# Patient Record
Sex: FEMALE | Race: WHITE | NOT HISPANIC OR LATINO | Employment: FULL TIME | URBAN - METROPOLITAN AREA
[De-identification: names, ages, dates, MRNs, and addresses within clinical notes are randomized per-mention and may not be internally consistent; named-entity substitution may affect disease eponyms.]

---

## 2018-09-09 ENCOUNTER — HOSPITAL ENCOUNTER (EMERGENCY)
Facility: OTHER | Age: 28
Discharge: HOME | End: 2018-09-09
Attending: EMERGENCY MEDICINE
Payer: COMMERCIAL

## 2018-09-09 VITALS
TEMPERATURE: 98 F | SYSTOLIC BLOOD PRESSURE: 148 MMHG | WEIGHT: 130 LBS | RESPIRATION RATE: 18 BRPM | DIASTOLIC BLOOD PRESSURE: 96 MMHG | BODY MASS INDEX: 20.89 KG/M2 | OXYGEN SATURATION: 100 % | HEIGHT: 66 IN | HEART RATE: 99 BPM

## 2018-09-09 DIAGNOSIS — K08.89 PAIN, DENTAL: Primary | ICD-10-CM

## 2018-09-09 LAB
B-HCG UR QL: NEGATIVE
CTP QC/QA: YES

## 2018-09-09 PROCEDURE — 25000003 PHARM REV CODE 250: Performed by: NURSE PRACTITIONER

## 2018-09-09 PROCEDURE — 81025 URINE PREGNANCY TEST: CPT | Performed by: EMERGENCY MEDICINE

## 2018-09-09 PROCEDURE — 99283 EMERGENCY DEPT VISIT LOW MDM: CPT

## 2018-09-09 RX ORDER — IBUPROFEN 400 MG/1
800 TABLET ORAL
Status: COMPLETED | OUTPATIENT
Start: 2018-09-09 | End: 2018-09-09

## 2018-09-09 RX ORDER — IBUPROFEN 800 MG/1
800 TABLET ORAL EVERY 6 HOURS PRN
Qty: 20 TABLET | Refills: 0 | Status: SHIPPED | OUTPATIENT
Start: 2018-09-09

## 2018-09-09 RX ADMIN — IBUPROFEN 800 MG: 400 TABLET ORAL at 02:09

## 2018-09-09 NOTE — ED PROVIDER NOTES
Encounter Date: 9/9/2018       History     Chief Complaint   Patient presents with    Dental Pain     pt c/o lower back right molar pain radiating to her ear. Pt reports recent cavity filling 2.5 weeks ago in [place] & has recently moved to [place].     26 y/o female which presents with right lower dental pain that began a few days after she had a dental procedure in [place]. The dental procedure was 2.5 weeks ago and she recently moved to [place]. She complains of ear pain and neck pain. She has been taking 400mg of ibuprofen and alternating extra strength tylenol. Pt denies fever, chills, nausea, or vomiting.       The history is provided by the patient.     Review of patient's allergies indicates:   Allergen Reactions    Penicillins Shortness Of Breath and Rash     History reviewed. No pertinent past medical history.  Past Surgical History:   Procedure Laterality Date    WISDOM TOOTH EXTRACTION       History reviewed. No pertinent family history.  Social History     Tobacco Use    Smoking status: Never Smoker    Smokeless tobacco: Never Used   Substance Use Topics    Alcohol use: Yes     Comment: occ    Drug use: No     Review of Systems   Constitutional: Negative.  Negative for chills and fever.   HENT: Positive for dental problem and ear pain. Negative for sore throat.    Respiratory: Negative.  Negative for cough and shortness of breath.    Cardiovascular: Negative.  Negative for chest pain.   Gastrointestinal: Negative.  Negative for abdominal pain, nausea and vomiting.   Musculoskeletal: Negative for myalgias.   All other systems reviewed and are negative.    Physical Exam     Initial Vitals [09/09/18 1347]   BP Pulse Resp Temp SpO2   (!) 148/96 99 18 97.9 °F (36.6 °C) 100 %      MAP       --         Physical Exam    Nursing note and vitals reviewed.  Constitutional: She appears well-developed and well-nourished.   HENT:   Head: Normocephalic and atraumatic.   Right Ear: Hearing, tympanic  membrane, external ear and ear canal normal.   Left Ear: Hearing, tympanic membrane, external ear and ear canal normal.   Nose: Nose normal. Right sinus exhibits no maxillary sinus tenderness and no frontal sinus tenderness. Left sinus exhibits no maxillary sinus tenderness and no frontal sinus tenderness.   Mouth/Throat: Uvula is midline, oropharynx is clear and moist and mucous membranes are normal. Normal dentition. No dental abscesses or dental caries. No oropharyngeal exudate or tonsillar abscesses.   NO signs of abnormal dentition- no signs of infection    Eyes: Conjunctivae and EOM are normal. Pupils are equal, round, and reactive to light.   Neck: Normal range of motion. Neck supple. No tracheal deviation present.   Cardiovascular: Normal rate, regular rhythm, normal heart sounds and intact distal pulses. Exam reveals no gallop and no friction rub.    No murmur heard.  Pulmonary/Chest: Breath sounds normal. No respiratory distress. She has no wheezes. She has no rhonchi. She has no rales. She exhibits no tenderness.   Musculoskeletal: Normal range of motion.   Lymphadenopathy:        Head (right side): No submental, no submandibular, no tonsillar, no preauricular, no posterior auricular and no occipital adenopathy present.        Head (left side): No submental, no submandibular, no tonsillar, no preauricular, no posterior auricular and no occipital adenopathy present.     She has no cervical adenopathy.   Neurological: She is alert and oriented to person, place, and time. She has normal strength. GCS score is 15. GCS eye subscore is 4. GCS verbal subscore is 5. GCS motor subscore is 6.   Skin: No abscess noted.       ED Course   Procedures  Labs Reviewed   POCT URINE PREGNANCY           Medical Decision Making:   Initial Assessment:   26 y/o female which presents with right lower molar pain. She has no fever, chills, abdominal pain, or nausea/vomiting. Pt was offered a IM Toradol injection for which she  refused as she is afraid of needles.   Differential Diagnosis:   Dental abscess, dental pain, dry socket, otitis media  ED Management:  Pt examined and no life threatening conditions were identified. She refused a Toradol IM injection. She was provided Ibuprofen 800 mg and discharged with a prescription. Patient was also advised to use clove oil to help with her pain. She has an appointment with her dentist in the morning and will follow up accordingly. Pt given strict return precautions and voiced understanding of all instructions.                      Clinical Impression:   The encounter diagnosis was Pain, dental.                       Lorrie Treviño, [facility]  09/09/18 [id number]

## 2018-09-09 NOTE — ED TRIAGE NOTES
"Pt Presents to ED via pov  with C/O bottom right jaw pain from sugery x 3 days right ear, neck, and shoulder. pt states " I had Dental surgery a few days ago and I had some pain afterward but now as of this morning the pain is so intense its radiating into my right ear, neck, and shoulder. RN notes small swelling to outer right cheek but no redness or drainage noted, inside of mouth is clear from ulsrations Pt denies chest pain, SOB, fever, N/V/D, blood urine/stool. Pt AAO x 3, Pt appears calm and cooperative.   "